# Patient Record
Sex: FEMALE | Race: WHITE | HISPANIC OR LATINO | Employment: FULL TIME
[De-identification: names, ages, dates, MRNs, and addresses within clinical notes are randomized per-mention and may not be internally consistent; named-entity substitution may affect disease eponyms.]

---

## 2024-06-10 ENCOUNTER — OFFICE VISIT (OUTPATIENT)
Dept: MEDICAL GROUP | Facility: CLINIC | Age: 29
End: 2024-06-10
Payer: COMMERCIAL

## 2024-06-10 VITALS
HEIGHT: 59 IN | HEART RATE: 76 BPM | DIASTOLIC BLOOD PRESSURE: 66 MMHG | WEIGHT: 120 LBS | BODY MASS INDEX: 24.19 KG/M2 | TEMPERATURE: 98.2 F | SYSTOLIC BLOOD PRESSURE: 98 MMHG

## 2024-06-10 DIAGNOSIS — J45.20 MILD INTERMITTENT ASTHMA WITHOUT COMPLICATION: ICD-10-CM

## 2024-06-10 DIAGNOSIS — F32.0 CURRENT MILD EPISODE OF MAJOR DEPRESSIVE DISORDER, UNSPECIFIED WHETHER RECURRENT (HCC): ICD-10-CM

## 2024-06-10 DIAGNOSIS — G43.109 MIGRAINE WITH AURA AND WITHOUT STATUS MIGRAINOSUS, NOT INTRACTABLE: ICD-10-CM

## 2024-06-10 PROCEDURE — 99213 OFFICE O/P EST LOW 20 MIN: CPT | Mod: GE

## 2024-06-10 RX ORDER — BUDESONIDE AND FORMOTEROL FUMARATE DIHYDRATE 80; 4.5 UG/1; UG/1
AEROSOL RESPIRATORY (INHALATION)
Qty: 10.2 G | Refills: 4 | Status: SHIPPED | OUTPATIENT
Start: 2024-06-10

## 2024-06-10 ASSESSMENT — ANXIETY QUESTIONNAIRES
2. NOT BEING ABLE TO STOP OR CONTROL WORRYING: MORE THAN HALF THE DAYS
7. FEELING AFRAID AS IF SOMETHING AWFUL MIGHT HAPPEN: NOT AT ALL
1. FEELING NERVOUS, ANXIOUS, OR ON EDGE: MORE THAN HALF THE DAYS
5. BEING SO RESTLESS THAT IT IS HARD TO SIT STILL: NOT AT ALL
GAD7 TOTAL SCORE: 10
6. BECOMING EASILY ANNOYED OR IRRITABLE: MORE THAN HALF THE DAYS
4. TROUBLE RELAXING: SEVERAL DAYS
3. WORRYING TOO MUCH ABOUT DIFFERENT THINGS: NEARLY EVERY DAY

## 2024-06-10 ASSESSMENT — PATIENT HEALTH QUESTIONNAIRE - PHQ9
5. POOR APPETITE OR OVEREATING: 0 - NOT AT ALL
SUM OF ALL RESPONSES TO PHQ QUESTIONS 1-9: 4
CLINICAL INTERPRETATION OF PHQ2 SCORE: 2

## 2024-06-10 NOTE — ASSESSMENT & PLAN NOTE
Discussed preventative medication versus abortive.  Patient wants to get pregnant soon and will have her Nexplanon removed next month.  Discussed that beta-blockers are category C in pregnancy and there is not a lot of data in humans on effects of medication during pregnancy.  Patient would like to continue with lifestyle modifications including decrease caffeine intake and increasing water intake.  She will also discus with her OB/GYN safe preventative medications for pregnancy.

## 2024-06-10 NOTE — ASSESSMENT & PLAN NOTE
Patient reports that she is doing better in terms of her depression.  She is still feeling very anxious due to currently living with her mother but states that her mother will be moving out soon.  PHQ-9 was 4 and BONNIE-7 was 14. Patient is interested in therapy.  Provided patient with therapy resources including [email or web address].

## 2024-06-10 NOTE — ASSESSMENT & PLAN NOTE
Patient was previously on budesonide daily and albuterol as needed.  She has been out of both of her inhalers and has not been able to exercise extensively because of this.  We discussed SMART therapy and starting patient on Symbicort she will take daily and then also use as needed.

## 2024-06-10 NOTE — PROGRESS NOTES
Subjective:     CC:   Chief Complaint   Patient presents with    [facility]     Routine check up    Medication Refill     Inhalers        HPI:   Denise Reed is a 29 y.o. female who presents for annual exam.  Patient reports that she has been experiencing 5-6 migraines a month.  She does get an aura prior to the migraines and will experience dizziness when she does get the migraine.  She states that she drinks about 4-5 caffeinated beverages a day and that she seems to get more migraines on days where she drinks more caffeine.  Her pain is often one-sided but can occasionally radiate to the other side.  The patient is also requesting a refill of her inhaler.  She was previously on budesonide and albuterol as needed which helped control her symptoms best.    Patient has GYN provider: No   Last Pap Smear: Unsure, will schedule to make an appointment.  H/O Abnormal Pap: No  Last Tdap: 5 years ago  Received HPV series: Yes    Exercise: strenuous regular exercise, aerobic > 3 hours a week  Diet: Fruits, vegetables, proteins      Hx STDs: No  Birth control: Nexplanon  Menses every month with 7 days with moderate bleeding.  Reports mild cramping and does not take OTC analgesics for cramps.  No significant bloating/fluid retention, pelvic pain, or dyspareunia. No abnormal vaginal discharge.  No breast tenderness, mass, nipple discharge, changes in size or contour, or abnormal cyclic discomfort.    OB History    Para Term  AB Living   2 2 2 0 0 2   SAB IAB Ectopic Molar Multiple Live Births   0 0 0 0 0 2      She  reports being sexually active and has had partner(s) who are male. She reports using the following method of birth control/protection: Implant.    She  has a past medical history of Allergy, Asthma, and Head ache.  She  has no past surgical history on file.    Family History   Problem Relation Age of Onset    Cancer Mother         UTERINE     No Known Problems Father     Diabetes Maternal  Grandmother     No Known Problems Maternal Grandfather     No Known Problems Paternal Grandmother     No Known Problems Brother      Social History     Tobacco Use    Smoking status: Never    Smokeless tobacco: Never   Vaping Use    Vaping status: Never Used   Substance Use Topics    Alcohol use: Yes     Comment: 4/wk    Drug use: Yes     Types: Inhaled, Oral       Patient Active Problem List    Diagnosis Date Noted    Migraine with aura and without status migrainosus, not intractable 06/10/2024    Major depressive disorder 08/13/2023    Mild intermittent asthma without complication 08/24/2018     Current Outpatient Medications   Medication Sig Dispense Refill    budesonide-formoterol (SYMBICORT) 80-4.5 MCG/ACT Aerosol Inhale 2 Puffs 2 times a day. May also inhale 1 Puff every four hours as needed (For wheezing or shortness of breath.). 10.2 g 4    albuterol 108 (90 Base) MCG/ACT Aero Soln inhalation aerosol Inhale 2 Puffs every 6 hours as needed for Shortness of Breath. 8.5 g 1    naproxen (NAPROSYN) 500 MG Tab Take 1 Tablet by mouth 2 times a day with meals. 30 Tablet 0    famotidine (PEPCID) 40 MG Tab Take 40 mg by mouth every day.      sucralfate (CARAFATE) 1 GM Tab Take 1 g by mouth 4 times a day. (Patient not taking: Reported on 7/24/2023)      ondansetron (ZOFRAN ODT) 4 MG TABLET DISPERSIBLE Take 1 tablet by mouth every 6 hours as needed for Nausea. (Patient not taking: Reported on 7/24/2023) 10 tablet 0    permethrin (ELIMITE) 5 % Cream Apply to affected areas topically from head to toe, leave on overnight, and wash off in the morning. (Patient not taking: Reported on 7/24/2023) 30 g 1    methocarbamol (ROBAXIN) 750 MG Tab Take 2 Tablets by mouth 3 times a day as needed (MUSCLE SPASM. Start with taking at night as needed.). Do not drive or operate potentially dangerous equipment while taking. (Patient not taking: Reported on 7/24/2023) 30 tablet 0    cefdinir (OMNICEF) 300 MG Cap Take 300 mg by mouth 2 times  "a day. 10 day course (Patient not taking: Reported on 7/24/2023)      ibuprofen (MOTRIN) 600 MG Tab Take 1 Tab by mouth every 8 hours as needed for Moderate Pain or Inflammation. 30 Tab 0    ondansetron (ZOFRAN ODT) 4 MG TABLET DISPERSIBLE Take 1 Tab by mouth every 6 hours as needed for Nausea. (Patient not taking: Reported on 7/24/2023) 10 Tab 0    acetaminophen (TYLENOL) 325 MG Tab Take 2 Tabs by mouth every 6 hours as needed for Moderate Pain or Fever. 30 Tab 0     No current facility-administered medications for this visit.     Allergies   Allergen Reactions    Mushroom Extract Complex Anaphylaxis       Review of Systems   Constitutional: Negative for fever, chills and malaise/fatigue.   HENT: Negative for congestion.    Eyes: Negative for pain.   Respiratory: Negative for cough and shortness of breath.    Cardiovascular: Negative for chest pain and leg swelling.   Gastrointestinal: Negative for nausea, vomiting, abdominal pain and diarrhea.   Genitourinary: Negative for dysuria and hematuria.   Skin: Negative for rash.   Neurological: Negative for dizziness, focal weakness and headaches.   Endo/Heme/Allergies: Does not bruise/bleed easily.   Psychiatric/Behavioral: Negative for depression.  The patient is not nervous/anxious.      Objective:   BP 98/66   Pulse 76   Temp 36.8 °C (98.2 °F) (Temporal)   Ht 1.499 m (4' 11\")   Wt 54.4 kg (120 lb)   BMI 24.24 kg/m²     Wt Readings from Last 4 Encounters:   06/10/24 54.4 kg (120 lb)   08/11/23 (P) 54.6 kg (120 lb 6.4 oz)   07/24/23 53.5 kg (118 lb)   07/05/22 52.7 kg (116 lb 3.2 oz)       Physical Exam:  Constitutional: Well-developed and well-nourished. Not diaphoretic. No distress.   Skin: Skin is warm and dry. No rash noted.  Head: Atraumatic without lesions.  Eyes: Conjunctivae and extraocular motions are normal. Pupils are equal, round, and reactive to light. No scleral icterus.   Ears:  External ears unremarkable. Tympanic membranes clear and intact.  Nose: " Nares patent. Septum midline. Turbinates without erythema nor edema. No discharge.   Mouth/Throat: Tongue normal. Oropharynx is clear and moist. Posterior pharynx without erythema or exudates.  Neck: Supple, trachea midline. Normal range of motion. No thyromegaly present. No lymphadenopathy--cervical or supraclavicular.  Cardiovascular: Regular rate and rhythm, S1 and S2 without murmur, rubs, or gallops.    Respiratory: Effort normal. Clear to auscultation throughout. No adventitious sounds.   Abdomen: Soft, non tender, and without distention. Active bowel sounds in all four quadrants. No rebound, guarding, masses or HSM.  Extremities: No cyanosis, clubbing, erythema, nor edema. Distal pulses intact and symmetric.   Musculoskeletal: All major joints AROM full in all directions without pain.  Neurological: Alert and oriented x 3. Grossly non-focal. Strength and sensation grossly intact. DTRs 2+/3 and symmetric.   Psychiatric:  Behavior, mood, and affect are appropriate.    Assessment and Plan:     1. Current mild episode of major depressive disorder, unspecified whether recurrent ([facility])    2. Migraine with aura and without status migrainosus, not intractable    3. Mild intermittent asthma without complication    Other orders  - budesonide-formoterol (SYMBICORT) 80-4.5 MCG/ACT Aerosol; Inhale 2 Puffs 2 times a day. May also inhale 1 Puff every four hours as needed (For wheezing or shortness of breath.).  Dispense: 10.2 g; Refill: 4      Health maintenance:     Labs per orders  Immunizations per orders  Patient counseled about skin care, diet, supplements, and exercise.  Discussed  family planning choices, diet and exercise    Major depressive disorder  Patient reports that she is doing better in terms of her depression.  She is still feeling very anxious due to currently living with her mother but states that her mother will be moving out soon.  PHQ-9 was 4 and BONNIE-7 was 14. Patient is interested in therapy.  Provided  patient with therapy resources including [facility].      Migraine with aura and without status migrainosus, not intractable  Discussed preventative medication versus abortive.  Patient wants to get pregnant soon and will have her Nexplanon removed next month.  Discussed that beta-blockers are category C in pregnancy and there is not a lot of data in humans on effects of medication during pregnancy.  Patient would like to continue with lifestyle modifications including decrease caffeine intake and increasing water intake.  She will also discus with her OB/GYN safe preventative medications for pregnancy.    Mild intermittent asthma without complication  Patient was previously on budesonide daily and albuterol as needed.  She has been out of both of her inhalers and has not been able to exercise extensively because of this.  We discussed SMART therapy and starting patient on Symbicort she will take daily and then also use as needed.        Follow-up: Return if symptoms worsen or fail to improve.

## 2024-07-16 ENCOUNTER — APPOINTMENT (OUTPATIENT)
Dept: OBGYN | Facility: CLINIC | Age: 29
End: 2024-07-16
Payer: COMMERCIAL

## 2024-07-16 VITALS — DIASTOLIC BLOOD PRESSURE: 66 MMHG | BODY MASS INDEX: 25.04 KG/M2 | WEIGHT: 124 LBS | SYSTOLIC BLOOD PRESSURE: 112 MMHG

## 2024-07-16 DIAGNOSIS — Z30.46 NEXPLANON REMOVAL: ICD-10-CM

## 2024-07-16 DIAGNOSIS — Z78.9 ATTEMPTING TO CONCEIVE: ICD-10-CM

## 2024-07-16 PROCEDURE — 3078F DIAST BP <80 MM HG: CPT | Performed by: STUDENT IN AN ORGANIZED HEALTH CARE EDUCATION/TRAINING PROGRAM

## 2024-07-16 PROCEDURE — 3074F SYST BP LT 130 MM HG: CPT | Performed by: STUDENT IN AN ORGANIZED HEALTH CARE EDUCATION/TRAINING PROGRAM

## 2024-07-16 PROCEDURE — 11982 REMOVE DRUG IMPLANT DEVICE: CPT | Performed by: STUDENT IN AN ORGANIZED HEALTH CARE EDUCATION/TRAINING PROGRAM

## 2024-08-26 DIAGNOSIS — J45.20 MILD INTERMITTENT ASTHMA WITHOUT COMPLICATION: ICD-10-CM

## 2024-08-26 RX ORDER — BUDESONIDE AND FORMOTEROL FUMARATE DIHYDRATE 80; 4.5 UG/1; UG/1
AEROSOL RESPIRATORY (INHALATION)
Qty: 10.2 G | Refills: 4 | Status: SHIPPED | OUTPATIENT
Start: 2024-08-26

## 2024-08-26 NOTE — TELEPHONE ENCOUNTER
Received request via: Patient    Was the patient seen in the last year in this department? Yes    Does the patient have an active prescription (recently filled or refills available) for medication(s) requested? No    [facility]    Does the patient have intermediate Plus and need 100-day supply? (This applies to ALL medications) Patient does not have SCP

## 2024-08-29 ENCOUNTER — TELEPHONE (OUTPATIENT)
Dept: MEDICAL GROUP | Facility: CLINIC | Age: 29
End: 2024-08-29

## 2024-09-03 RX ORDER — FLUTICASONE PROPIONATE AND SALMETEROL 100; 50 UG/1; UG/1
1 POWDER RESPIRATORY (INHALATION) EVERY 12 HOURS
COMMUNITY
End: 2024-09-03

## 2024-09-03 RX ORDER — FLUTICASONE PROPIONATE AND SALMETEROL 100; 50 UG/1; UG/1
1 POWDER RESPIRATORY (INHALATION) EVERY 12 HOURS
Qty: 1 EACH | Refills: 0 | OUTPATIENT
Start: 2024-09-03

## 2024-09-03 RX ORDER — MOMETASONE FUROATE AND FORMOTEROL FUMARATE DIHYDRATE 50; 5 UG/1; UG/1
2 AEROSOL RESPIRATORY (INHALATION) 2 TIMES DAILY
Qty: 13 G | Refills: 3 | Status: SHIPPED | OUTPATIENT
Start: 2024-09-03

## 2024-09-03 NOTE — TELEPHONE ENCOUNTER
Received request via: Pharmacy    Was the patient seen in the last year in this department? Yes    Does the patient have an active prescription (recently filled or refills available) for medication(s) requested? No    Pharmacy Name: [facility]    Does the patient have senior living Plus and need 100-day supply? (This applies to ALL medications) Patient does not have SCP

## 2024-09-26 ENCOUNTER — APPOINTMENT (OUTPATIENT)
Dept: RADIOLOGY | Facility: MEDICAL CENTER | Age: 29
End: 2024-09-26
Attending: EMERGENCY MEDICINE
Payer: COMMERCIAL

## 2024-09-26 ENCOUNTER — HOSPITAL ENCOUNTER (EMERGENCY)
Facility: MEDICAL CENTER | Age: 29
End: 2024-09-26
Attending: EMERGENCY MEDICINE
Payer: COMMERCIAL

## 2024-09-26 VITALS
HEART RATE: 65 BPM | BODY MASS INDEX: 25.83 KG/M2 | DIASTOLIC BLOOD PRESSURE: 61 MMHG | RESPIRATION RATE: 16 BRPM | SYSTOLIC BLOOD PRESSURE: 101 MMHG | OXYGEN SATURATION: 99 % | WEIGHT: 127.87 LBS | TEMPERATURE: 98 F

## 2024-09-26 DIAGNOSIS — O20.8 SUBCHORIONIC HEMORRHAGE IN FIRST TRIMESTER: ICD-10-CM

## 2024-09-26 DIAGNOSIS — O20.9 VAGINAL BLEEDING AFFECTING EARLY PREGNANCY: ICD-10-CM

## 2024-09-26 LAB
ALBUMIN SERPL BCP-MCNC: 3.9 G/DL (ref 3.2–4.9)
ALBUMIN/GLOB SERPL: 1.4 G/DL
ALP SERPL-CCNC: 63 U/L (ref 30–99)
ALT SERPL-CCNC: 27 U/L (ref 2–50)
ANION GAP SERPL CALC-SCNC: 10 MMOL/L (ref 7–16)
APPEARANCE UR: CLEAR
AST SERPL-CCNC: 25 U/L (ref 12–45)
B-HCG SERPL-ACNC: ABNORMAL MIU/ML (ref 0–5)
BASOPHILS # BLD AUTO: 0.8 % (ref 0–1.8)
BASOPHILS # BLD: 0.07 K/UL (ref 0–0.12)
BILIRUB SERPL-MCNC: 0.2 MG/DL (ref 0.1–1.5)
BILIRUB UR QL STRIP.AUTO: NEGATIVE
BUN SERPL-MCNC: 8 MG/DL (ref 8–22)
CALCIUM ALBUM COR SERPL-MCNC: 9.1 MG/DL (ref 8.5–10.5)
CALCIUM SERPL-MCNC: 9 MG/DL (ref 8.5–10.5)
CHLORIDE SERPL-SCNC: 103 MMOL/L (ref 96–112)
CO2 SERPL-SCNC: 23 MMOL/L (ref 20–33)
COLOR UR: YELLOW
CREAT SERPL-MCNC: 0.6 MG/DL (ref 0.5–1.4)
EOSINOPHIL # BLD AUTO: 0.08 K/UL (ref 0–0.51)
EOSINOPHIL NFR BLD: 0.9 % (ref 0–6.9)
ERYTHROCYTE [DISTWIDTH] IN BLOOD BY AUTOMATED COUNT: 43.8 FL (ref 35.9–50)
GFR SERPLBLD CREATININE-BSD FMLA CKD-EPI: 126 ML/MIN/1.73 M 2
GLOBULIN SER CALC-MCNC: 2.8 G/DL (ref 1.9–3.5)
GLUCOSE SERPL-MCNC: 77 MG/DL (ref 65–99)
GLUCOSE UR STRIP.AUTO-MCNC: NEGATIVE MG/DL
HCT VFR BLD AUTO: 38.3 % (ref 37–47)
HGB BLD-MCNC: 12.3 G/DL (ref 12–16)
IMM GRANULOCYTES # BLD AUTO: 0.04 K/UL (ref 0–0.11)
IMM GRANULOCYTES NFR BLD AUTO: 0.5 % (ref 0–0.9)
KETONES UR STRIP.AUTO-MCNC: NEGATIVE MG/DL
LEUKOCYTE ESTERASE UR QL STRIP.AUTO: NEGATIVE
LIPASE SERPL-CCNC: 36 U/L (ref 11–82)
LYMPHOCYTES # BLD AUTO: 2.35 K/UL (ref 1–4.8)
LYMPHOCYTES NFR BLD: 27.3 % (ref 22–41)
MCH RBC QN AUTO: 28.5 PG (ref 27–33)
MCHC RBC AUTO-ENTMCNC: 32.1 G/DL (ref 32.2–35.5)
MCV RBC AUTO: 88.9 FL (ref 81.4–97.8)
MICRO URNS: NORMAL
MONOCYTES # BLD AUTO: 0.65 K/UL (ref 0–0.85)
MONOCYTES NFR BLD AUTO: 7.6 % (ref 0–13.4)
NEUTROPHILS # BLD AUTO: 5.41 K/UL (ref 1.82–7.42)
NEUTROPHILS NFR BLD: 62.9 % (ref 44–72)
NITRITE UR QL STRIP.AUTO: NEGATIVE
NRBC # BLD AUTO: 0 K/UL
NRBC BLD-RTO: 0 /100 WBC (ref 0–0.2)
NUMBER OF RH DOSES IND 8505RD: NORMAL
PH UR STRIP.AUTO: 6 [PH] (ref 5–8)
PLATELET # BLD AUTO: 242 K/UL (ref 164–446)
PMV BLD AUTO: 9.3 FL (ref 9–12.9)
POTASSIUM SERPL-SCNC: 3.6 MMOL/L (ref 3.6–5.5)
PROT SERPL-MCNC: 6.7 G/DL (ref 6–8.2)
PROT UR QL STRIP: NEGATIVE MG/DL
RBC # BLD AUTO: 4.31 M/UL (ref 4.2–5.4)
RBC UR QL AUTO: NEGATIVE
RH BLD: NORMAL
SODIUM SERPL-SCNC: 136 MMOL/L (ref 135–145)
SP GR UR STRIP.AUTO: 1.02
UROBILINOGEN UR STRIP.AUTO-MCNC: 1 MG/DL
WBC # BLD AUTO: 8.6 K/UL (ref 4.8–10.8)

## 2024-09-26 PROCEDURE — 85025 COMPLETE CBC W/AUTO DIFF WBC: CPT

## 2024-09-26 PROCEDURE — 99284 EMERGENCY DEPT VISIT MOD MDM: CPT

## 2024-09-26 PROCEDURE — 81003 URINALYSIS AUTO W/O SCOPE: CPT

## 2024-09-26 PROCEDURE — 80053 COMPREHEN METABOLIC PANEL: CPT

## 2024-09-26 PROCEDURE — 86901 BLOOD TYPING SEROLOGIC RH(D): CPT

## 2024-09-26 PROCEDURE — 36415 COLL VENOUS BLD VENIPUNCTURE: CPT

## 2024-09-26 PROCEDURE — 83690 ASSAY OF LIPASE: CPT

## 2024-09-26 PROCEDURE — 76801 OB US < 14 WKS SINGLE FETUS: CPT

## 2024-09-26 PROCEDURE — 84702 CHORIONIC GONADOTROPIN TEST: CPT

## 2024-09-26 NOTE — ED TRIAGE NOTES
Pt ambulatory to triage c/o pregnant approx 9 weeks and had began having vaginal bleeding this am. Nad. Vss. Pt states has not gone through more than 2 pads in and hour

## 2024-09-26 NOTE — ED PROVIDER NOTES
ED Provider Note    CHIEF COMPLAINT  Chief Complaint   Patient presents with    Pregnancy    Vaginal Bleeding       EXTERNAL RECORDS REVIEWED  2024, outpatient gynecology note for Nexplanon removal    HPI/ROS    Denise Reed is a 29 y.o. female who presents for evaluation of vaginal bleeding in the setting of early pregnancy.   at about 8-9 weeks, had some spotting earlier in pregnancy.  Today she developed heavier bleeding, the significant other at bedside shows me a photograph of some paper towel that has some blood on it.  She states most recent time she urinated here in emergency department to provide a urine sample there was no more blood.  She has had some mild cramping throughout this pregnancy but nothing worsening over the past 24 hours.  Her first OB/GYN appointment is next week.  No other complaints    PAST MEDICAL HISTORY   has a past medical history of Allergy, Asthma, and Head ache.    SURGICAL HISTORY  patient denies any surgical history    FAMILY HISTORY  Family History   Problem Relation Age of Onset    Cancer Mother         UTERINE     No Known Problems Father     Diabetes Maternal Grandmother     No Known Problems Maternal Grandfather     No Known Problems Paternal Grandmother     No Known Problems Brother        SOCIAL HISTORY  Social History     Tobacco Use    Smoking status: Never    Smokeless tobacco: Never   Vaping Use    Vaping status: Never Used   Substance and Sexual Activity    Alcohol use: Yes     Comment: 4/wk    Drug use: Yes     Types: Inhaled, Oral    Sexual activity: Yes     Partners: Male     Birth control/protection: Implant     Comment: Plans Nexplanon       CURRENT MEDICATIONS  Home Medications    **Home medications have not yet been reviewed for this encounter**       Audit from Redirected Encounters    **Home medications have not yet been reviewed for this encounter**         ALLERGIES  Allergies   Allergen Reactions    Mushroom Extract Complex Anaphylaxis        PHYSICAL EXAM  VITAL SIGNS: BP 96/57   Pulse 68   Temp 36.9 °C (98.5 °F) (Temporal)   Resp 14   Wt 58 kg (127 lb 13.9 oz)   LMP 08/09/2024 (Exact Date)   SpO2 100%   BMI 25.83 kg/m²    Constitutional: Alert in no apparent distress.  HENT: No signs of significant acute trauma.   Eyes: Conjunctiva normal, non-icteric.   Chest: Normal nonlabored respirations  Skin: No appreciable rash on the exposed skin  Musculoskeletal: No obvious acute trauma appreciated  Neurologic: Alert, no obvious focal deficits noted.        EKG/LABS  Results for orders placed or performed during the hospital encounter of 09/26/24   CBC WITH DIFFERENTIAL   Result Value Ref Range    WBC 8.6 4.8 - 10.8 K/uL    RBC 4.31 4.20 - 5.40 M/uL    Hemoglobin 12.3 12.0 - 16.0 g/dL    Hematocrit 38.3 37.0 - 47.0 %    MCV 88.9 81.4 - 97.8 fL    MCH 28.5 27.0 - 33.0 pg    MCHC 32.1 (L) 32.2 - 35.5 g/dL    RDW 43.8 35.9 - 50.0 fL    Platelet Count 242 164 - 446 K/uL    MPV 9.3 9.0 - 12.9 fL    Neutrophils-Polys 62.90 44.00 - 72.00 %    Lymphocytes 27.30 22.00 - 41.00 %    Monocytes 7.60 0.00 - 13.40 %    Eosinophils 0.90 0.00 - 6.90 %    Basophils 0.80 0.00 - 1.80 %    Immature Granulocytes 0.50 0.00 - 0.90 %    Nucleated RBC 0.00 0.00 - 0.20 /100 WBC    Neutrophils (Absolute) 5.41 1.82 - 7.42 K/uL    Lymphs (Absolute) 2.35 1.00 - 4.80 K/uL    Monos (Absolute) 0.65 0.00 - 0.85 K/uL    Eos (Absolute) 0.08 0.00 - 0.51 K/uL    Baso (Absolute) 0.07 0.00 - 0.12 K/uL    Immature Granulocytes (abs) 0.04 0.00 - 0.11 K/uL    NRBC (Absolute) 0.00 K/uL   COMP METABOLIC PANEL   Result Value Ref Range    Sodium 136 135 - 145 mmol/L    Potassium 3.6 3.6 - 5.5 mmol/L    Chloride 103 96 - 112 mmol/L    Co2 23 20 - 33 mmol/L    Anion Gap 10.0 7.0 - 16.0    Glucose 77 65 - 99 mg/dL    Bun 8 8 - 22 mg/dL    Creatinine 0.60 0.50 - 1.40 mg/dL    Calcium 9.0 8.5 - 10.5 mg/dL    Correct Calcium 9.1 8.5 - 10.5 mg/dL    AST(SGOT) 25 12 - 45 U/L    ALT(SGPT) 27 2 - 50 U/L     Alkaline Phosphatase 63 30 - 99 U/L    Total Bilirubin 0.2 0.1 - 1.5 mg/dL    Albumin 3.9 3.2 - 4.9 g/dL    Total Protein 6.7 6.0 - 8.2 g/dL    Globulin 2.8 1.9 - 3.5 g/dL    A-G Ratio 1.4 g/dL   LIPASE   Result Value Ref Range    Lipase 36 11 - 82 U/L   HCG QUANTITATIVE   Result Value Ref Range    Bhcg 36141.0 (H) 0.0 - 5.0 mIU/mL   URINALYSIS,CULTURE IF INDICATED    Specimen: Urine   Result Value Ref Range    Color Yellow     Character Clear     Specific Gravity 1.020 <1.035    Ph 6.0 5.0 - 8.0    Glucose Negative Negative mg/dL    Ketones Negative Negative mg/dL    Protein Negative Negative mg/dL    Bilirubin Negative Negative    Urobilinogen, Urine 1.0 Negative    Nitrite Negative Negative    Leukocyte Esterase Negative Negative    Occult Blood Negative Negative    Micro Urine Req see below    RH Type for Rhogam from E.D.   Result Value Ref Range    Emergency Department Rh Typing POS     Number Of Rh Doses Indicated ZERO    ESTIMATED GFR   Result Value Ref Range    GFR (CKD-EPI) 126 >60 mL/min/1.73 m 2      RADIOLOGY/PROCEDURES   I have independently interpreted the diagnostic imaging associated with this visit and am waiting the final reading from the radiologist.   My preliminary interpretation is as follows: IUP    Radiologist interpretation:  US-OB 1ST TRIMESTER WITH TRANSVAGINAL (COMBO)   Final Result      1.  Single living intrauterine pregnancy at 7 weeks, 3 days estimated gestational age. Estimated date of delivery is 5/12/2025.   2.  Small to moderate-sized subchorionic hemorrhage. Follow-up is recommended.          COURSE & MEDICAL DECISION MAKING    ASSESSMENT, COURSE AND PLAN  Care Narrative: 29-year-old female with vaginal bleeding in the setting of early pregnancy, some intermittent spotting throughout this pregnancy but became heavier today.  Initial triage note indicates that there was bleeding through 2 pads in an hour, I saw photograph of some the bleeding and it was not indicative of that  heavy of bleeding.  In fact it is now resolved.  Blood work reveals an hCG of 87,000.  The ultrasound reveals a subchorionic hemorrhage without any other obvious abnormalities.  Rh+, no need for RhoGAM.  No bacteriuria.  Her first OB/GYN evaluation is in 2 weeks.  For now she is discharged home in stable condition with strict return instructions provided    FINAL DIAGNOSIS  1. Vaginal bleeding affecting early pregnancy    2. Subchorionic hemorrhage in first trimester         Electronically signed by: Shun Gomez M.D., 9/26/2024 4:17 PM

## 2024-10-31 ENCOUNTER — TELEPHONE (OUTPATIENT)
Dept: OBGYN | Facility: CLINIC | Age: 29
End: 2024-10-31
Payer: COMMERCIAL

## 2024-11-21 RX ORDER — BUDESONIDE AND FORMOTEROL FUMARATE DIHYDRATE 80; 4.5 UG/1; UG/1
2 AEROSOL RESPIRATORY (INHALATION) 2 TIMES DAILY
Qty: 10.2 G | Refills: 2 | Status: SHIPPED | OUTPATIENT
Start: 2024-11-21

## 2025-01-14 ENCOUNTER — HOSPITAL ENCOUNTER (EMERGENCY)
Facility: MEDICAL CENTER | Age: 30
End: 2025-01-14
Attending: OBSTETRICS & GYNECOLOGY | Admitting: OBSTETRICS & GYNECOLOGY
Payer: COMMERCIAL

## 2025-01-14 ENCOUNTER — APPOINTMENT (OUTPATIENT)
Dept: RADIOLOGY | Facility: MEDICAL CENTER | Age: 30
End: 2025-01-14
Attending: OBSTETRICS & GYNECOLOGY
Payer: COMMERCIAL

## 2025-01-14 VITALS
SYSTOLIC BLOOD PRESSURE: 100 MMHG | DIASTOLIC BLOOD PRESSURE: 58 MMHG | HEIGHT: 59 IN | BODY MASS INDEX: 27.82 KG/M2 | RESPIRATION RATE: 16 BRPM | WEIGHT: 138 LBS | TEMPERATURE: 98.6 F | HEART RATE: 65 BPM | OXYGEN SATURATION: 99 %

## 2025-01-14 PROCEDURE — 76815 OB US LIMITED FETUS(S): CPT

## 2025-01-14 PROCEDURE — 99284 EMERGENCY DEPT VISIT MOD MDM: CPT

## 2025-01-14 ASSESSMENT — FIBROSIS 4 INDEX: FIB4 SCORE: 0.54

## 2025-01-15 NOTE — PROGRESS NOTES
1900: Report received from Ce LEYVA.   Pt transferred to [facility] 2.All questions and concerns addressed.  1935: US at bedside.   2000: Dr Huber in department.  tech spoke with provider.   2010: Dr Huber at bedside.Discharge order received. Pt denies pain, denies UC's. Order received for discharge home with follow up at next scheduled appointment. Patient/FOB deny questions regarding care since arrival to [facility] or POC for discharge home. Patient discharged home with specific instruction to return to L&D/Physician ie.. Bleeding/ROM/decreased FM/labor/concerns for self or baby. Ambulatory off unit with all belongings.

## 2025-01-15 NOTE — ED PROVIDER NOTES
"Obstetrics and Gynecology  Obstetric Emergency Department Assessment Note    ID: 29 y.o. is a  with IUP at 23w1d    Primary Obstetrician: Nena Hernandez M.D.    Assessing Obstetrician: Cheryl Lacy MD    S: Pt presents to triage with a complaint that she feel today around 1530 while walking. Her mother also slipped and fell and broke her ankle. The patient fell onto her low back and buttocks. No VB, CTX, or LOF. Baby moving well. She is Rh positive.    ROS: 10 systems negative except as noted above.    O: /58   Pulse 65   Temp 37 °C (98.6 °F) (Temporal)   Resp 16   Ht 1.499 m (4' 11\")   Wt 62.6 kg (138 lb)   LMP 2024 (Exact Date)   SpO2 99%   BMI 27.87 kg/m²    Gen: NAD, AAO  HEENT: NC/AT, MMM  Neck: supple  Abd: Gravid, soft, uterus NTTP, no rebound/guarding  Ext: WWP, 2+ DPP, no edema  Skin: no rash  Neuro: no gross deficits, EOMI  Psy: mood good, affect normal and congruent  Pelvic: SVE deferred  FHR present with doppler.    2025 7:25 PM     HISTORY/REASON FOR EXAM:  Other - please comment; Pt fall this afternoon. Need BETSEY and R/O abruption     TECHNIQUE/EXAM DESCRIPTION: OB limited ultrasound.     COMPARISON:  None     FINDINGS:  Fetal Lie:  Transverse     Clinical EDUAR by LMP:  2025     Placenta (Location):  Anterior  Placenta Previa: No  Placental ndGndrndanddndend:nd nd2nd Amniotic Fluid Volume:  MVP= 6.7 cm     Fetal Heart Rate:  150 bpm     Cervical Length:  3.73 cm     IMPRESSION:     No obvious evidence of placenta abruption.       Assessment:   Denise Reed is a 29 y.o.  at 23w1d  S/p fall onto buttocks - no evidence of  labor, vaginal bleeding, or abruption.  Reassuring, reactive NST.    Plan:  Pt discharged to home.  Tylenol prn pain  Warm baths with epsom salt prn pain  Gentle stretching  Monitor for contractions or vaginal bleeding or any other concerns  F/u with Dr. Hernandez as scheduled  Return here prn concerns      Cheryl Domingo" LUIS Ramirez, 1/14/2025, 8:23 PM

## 2025-01-15 NOTE — PROGRESS NOTES
Pt presents to [facility] after she suffered a fall today around 1530 today while she was walking with her mother; her mother slipped and fell onto the patient wherein they both fell; pt fell onto her low back. She denies VB, LOF, cramping or contractions. Pt placed into recliner chair in triage, ; toco applied. 1705 Dr. Lacy notified of pt's arrival/status, orders rec'd. Pt given a light snack to eat.  1745 Report to AUTUMN Encinas.

## 2025-01-15 NOTE — PROGRESS NOTES
1745: Report received from Erica KNUTSON RN. POC discussed.     1900: Report given to Jennifer BASS RN.